# Patient Record
Sex: FEMALE | Race: WHITE | NOT HISPANIC OR LATINO | Employment: OTHER | URBAN - METROPOLITAN AREA
[De-identification: names, ages, dates, MRNs, and addresses within clinical notes are randomized per-mention and may not be internally consistent; named-entity substitution may affect disease eponyms.]

---

## 2024-01-29 DIAGNOSIS — Z12.11 COLON CANCER SCREENING: ICD-10-CM

## 2024-01-29 RX ORDER — POLYETHYLENE GLYCOL 3350, SODIUM SULFATE ANHYDROUS, SODIUM BICARBONATE, SODIUM CHLORIDE, POTASSIUM CHLORIDE 236; 22.74; 6.74; 5.86; 2.97 G/4L; G/4L; G/4L; G/4L; G/4L
4000 POWDER, FOR SOLUTION ORAL ONCE
Qty: 4000 ML | Refills: 0 | Status: SHIPPED | OUTPATIENT
Start: 2024-01-29 | End: 2024-01-29

## 2024-03-06 ENCOUNTER — APPOINTMENT (OUTPATIENT)
Dept: GASTROENTEROLOGY | Facility: EXTERNAL LOCATION | Age: 64
End: 2024-03-06
Payer: COMMERCIAL

## 2024-04-15 ENCOUNTER — OFFICE VISIT (OUTPATIENT)
Dept: GASTROENTEROLOGY | Facility: EXTERNAL LOCATION | Age: 64
End: 2024-04-15
Payer: COMMERCIAL

## 2024-04-15 DIAGNOSIS — D12.4 BENIGN NEOPLASM OF DESCENDING COLON: ICD-10-CM

## 2024-04-15 DIAGNOSIS — Z12.11 SCREEN FOR COLON CANCER: Primary | ICD-10-CM

## 2024-04-15 DIAGNOSIS — Z86.010 PERSONAL HISTORY OF COLONIC POLYPS: ICD-10-CM

## 2024-04-15 PROCEDURE — 88305 TISSUE EXAM BY PATHOLOGIST: CPT

## 2024-04-15 PROCEDURE — 88305 TISSUE EXAM BY PATHOLOGIST: CPT | Performed by: PATHOLOGY

## 2024-04-15 PROCEDURE — 45381 COLONOSCOPY SUBMUCOUS NJX: CPT | Performed by: INTERNAL MEDICINE

## 2024-04-15 PROCEDURE — 45385 COLONOSCOPY W/LESION REMOVAL: CPT | Performed by: INTERNAL MEDICINE

## 2024-04-15 NOTE — PROGRESS NOTES
Patient had surveillance colonoscopy and 3 polyps were removed.  One was larger in the splenic flexure and proximal descending colon the area that was estimated.  This was removed completely with snare and cautery.  2 clips were placed and spot was placed.  I recommend a repeat exam in 3 years if no evidence of dysplasia or malignancy.

## 2024-04-16 ENCOUNTER — LAB REQUISITION (OUTPATIENT)
Dept: LAB | Facility: HOSPITAL | Age: 64
End: 2024-04-16
Payer: COMMERCIAL

## 2024-04-20 LAB
LABORATORY COMMENT REPORT: NORMAL
PATH REPORT.FINAL DX SPEC: NORMAL
PATH REPORT.GROSS SPEC: NORMAL
PATH REPORT.RELEVANT HX SPEC: NORMAL
PATH REPORT.TOTAL CANCER: NORMAL

## 2024-08-22 ENCOUNTER — APPOINTMENT (OUTPATIENT)
Dept: PRIMARY CARE | Facility: CLINIC | Age: 64
End: 2024-08-22
Payer: COMMERCIAL

## 2024-08-22 VITALS
HEART RATE: 78 BPM | BODY MASS INDEX: 26.5 KG/M2 | SYSTOLIC BLOOD PRESSURE: 122 MMHG | OXYGEN SATURATION: 98 % | WEIGHT: 135 LBS | DIASTOLIC BLOOD PRESSURE: 76 MMHG | TEMPERATURE: 97.4 F | HEIGHT: 60 IN

## 2024-08-22 DIAGNOSIS — M25.561 RIGHT KNEE PAIN, UNSPECIFIED CHRONICITY: Primary | ICD-10-CM

## 2024-08-22 PROCEDURE — 99212 OFFICE O/P EST SF 10 MIN: CPT | Performed by: INTERNAL MEDICINE

## 2024-08-22 PROCEDURE — 3008F BODY MASS INDEX DOCD: CPT | Performed by: INTERNAL MEDICINE

## 2024-08-22 PROCEDURE — 1036F TOBACCO NON-USER: CPT | Performed by: INTERNAL MEDICINE

## 2024-08-22 ASSESSMENT — ENCOUNTER SYMPTOMS
NAUSEA: 0
APPETITE CHANGE: 0
DIZZINESS: 0
FLANK PAIN: 0
COUGH: 0
STRIDOR: 0
HEADACHES: 0
DIARRHEA: 0
WHEEZING: 0
NUMBNESS: 0
CONFUSION: 0
HALLUCINATIONS: 0
ABDOMINAL PAIN: 0
FEVER: 0
NECK PAIN: 0
VOMITING: 0
EYE PAIN: 0
PALPITATIONS: 0
JOINT SWELLING: 0
CONSTIPATION: 0
WEAKNESS: 0
NERVOUS/ANXIOUS: 0
SINUS PAIN: 0
COLOR CHANGE: 0
DYSURIA: 0
ACTIVITY CHANGE: 0
SORE THROAT: 0
SHORTNESS OF BREATH: 0
TREMORS: 0
WOUND: 0
MYALGIAS: 0
UNEXPECTED WEIGHT CHANGE: 0
ADENOPATHY: 0
SLEEP DISTURBANCE: 0
SEIZURES: 0
BACK PAIN: 0
HEMATURIA: 0
BLOOD IN STOOL: 0
FATIGUE: 0
ARTHRALGIAS: 1

## 2024-08-22 NOTE — PROGRESS NOTES
Subjective   Patient ID: Sheila Quan is a 63 y.o. female who presents for Knee Pain (Right knee pain since June, back of knee).    HPI   Patient has non traumatic right knee pain from June. She is able to walk but pain is not getting better and sometime she has to take OTC analgesics.  Patient want to see an orthopedics for evaluation.     Review of Systems   Constitutional:  Negative for activity change, appetite change, fatigue, fever and unexpected weight change.   HENT:  Negative for dental problem, ear discharge, hearing loss, nosebleeds, postnasal drip, sinus pain and sore throat.    Eyes:  Negative for pain.   Respiratory:  Negative for cough, shortness of breath, wheezing and stridor.    Cardiovascular:  Negative for chest pain, palpitations and leg swelling.   Gastrointestinal:  Negative for abdominal pain, blood in stool, constipation, diarrhea, nausea and vomiting.   Genitourinary:  Negative for decreased urine volume, dyspareunia, dysuria, flank pain, hematuria and urgency.   Musculoskeletal:  Positive for arthralgias. Negative for back pain, gait problem, joint swelling, myalgias and neck pain.   Skin:  Negative for color change, rash and wound.   Allergic/Immunologic: Negative for environmental allergies and food allergies.   Neurological:  Negative for dizziness, tremors, seizures, syncope, weakness, numbness and headaches.   Hematological:  Negative for adenopathy.   Psychiatric/Behavioral:  Negative for behavioral problems, confusion, hallucinations, self-injury and sleep disturbance. The patient is not nervous/anxious.      Objective   /76   Pulse 78   Temp 36.3 °C (97.4 °F)   Ht 1.524 m (5')   Wt 61.2 kg (135 lb)   SpO2 98%   BMI 26.37 kg/m²     Physical Exam  Vitals and nursing note reviewed.   Constitutional:       General: She is not in acute distress.     Appearance: Normal appearance. She is not ill-appearing or toxic-appearing.   HENT:      Nose: Nose normal.   Eyes:       Conjunctiva/sclera: Conjunctivae normal.   Pulmonary:      Effort: Pulmonary effort is normal.   Musculoskeletal:         General: No deformity or signs of injury.      Cervical back: Normal range of motion.      Right lower leg: No edema.      Left lower leg: No edema.   Neurological:      General: No focal deficit present.      Mental Status: She is alert and oriented to person, place, and time.   Psychiatric:         Mood and Affect: Mood normal.         Behavior: Behavior normal.       Assessment/Plan   Problem List Items Addressed This Visit    None  Visit Diagnoses       Right knee pain, unspecified chronicity    -  Primary    Relevant Orders    Referral to Orthopaedic Surgery

## 2024-08-27 ENCOUNTER — HOSPITAL ENCOUNTER (OUTPATIENT)
Dept: RADIOLOGY | Facility: HOSPITAL | Age: 64
Discharge: HOME | End: 2024-08-27
Payer: COMMERCIAL

## 2024-08-27 ENCOUNTER — TELEPHONE (OUTPATIENT)
Dept: ORTHOPEDIC SURGERY | Facility: CLINIC | Age: 64
End: 2024-08-27

## 2024-08-27 ENCOUNTER — APPOINTMENT (OUTPATIENT)
Dept: ORTHOPEDIC SURGERY | Facility: CLINIC | Age: 64
End: 2024-08-27
Payer: COMMERCIAL

## 2024-08-27 DIAGNOSIS — M25.561 RIGHT KNEE PAIN, UNSPECIFIED CHRONICITY: Primary | ICD-10-CM

## 2024-08-27 DIAGNOSIS — M25.561 RIGHT KNEE PAIN, UNSPECIFIED CHRONICITY: ICD-10-CM

## 2024-08-27 PROCEDURE — 73564 X-RAY EXAM KNEE 4 OR MORE: CPT | Mod: RT

## 2024-08-27 PROCEDURE — 99204 OFFICE O/P NEW MOD 45 MIN: CPT

## 2024-08-27 PROCEDURE — 1036F TOBACCO NON-USER: CPT

## 2024-08-27 ASSESSMENT — PAIN SCALES - GENERAL: PAINLEVEL_OUTOF10: 1

## 2024-08-27 ASSESSMENT — PAIN - FUNCTIONAL ASSESSMENT: PAIN_FUNCTIONAL_ASSESSMENT: 0-10

## 2024-08-27 NOTE — PROGRESS NOTES
ISELA Quan is a 63 y.o. female  in office today for   Chief Complaint   Patient presents with    Right Knee - Pain     Pt states the knee has been bothering her since June-She does not recall any injuries-she does take a lot of walks. She walks a dog that does pull a lot    .  she states pain is posterior knee, worse with first standing when it feels stiff.  She has tried ice/heat, antiinflammatories occasionally.      Medication  No current outpatient medications on file prior to visit.     No current facility-administered medications on file prior to visit.       Physical Exam  Constitutional: well developed, well nourished female in no acute distress  Psychiatric: normal mood, appropriate affect  Eyes: sclera anicteric  HENT: normocephalic/atraumatic  CV: regular rate and rhythm   Respiratory: non labored breathing  Integumentary: no rash  Neurological: moves all extremities    Right Knee Exam     Tenderness   Right knee tenderness location: posterior knee.    Range of Motion   The patient has normal right knee ROM.    Tests   Lolis:  Medial - positive Lateral - negative  Varus: negative Valgus: negative  Drawer:  Anterior - negative    Posterior - negative  Patellar apprehension: negative    Other   Erythema: absent  Scars: absent  Sensation: normal  Swelling: mild              Imaging/Lab:  X-rays were taken today which were reviewed by myself and read by myself and show mild degenerative changes with osteophyte formation patellofemoral.  No acute fracture or dislocation      Assessment  Assessment: right knee pain    Plan  Plan:  History, physical exam, and imaging were reviewed with patient. Discussed physical exam can not exclude meniscus injury.  Discussed starting with conservative options for knee pain including RICE, antiinflammatories, injections, and PT.  States she had a reaction to oral steroids in the past, so not wanting a steroid injection.  PT orders given.  Will continue with RICE  and NSAIDs as needed.  Follow Up: Patient to follow up as needed if pain persists or gets worse.      All questions were answered for the patient prior to end of exam and patient addressed their understanding.    Sary Moreno PA-C  08/27/24

## 2024-09-19 NOTE — PROGRESS NOTES
Physical Therapy    Physical Therapy Evaluation     Patient Name: Sheila Quan  MRN: [id number]  Today's Date: 9/20/2024    Time Entry:   Time Calculation  Start Time: 1025  Stop Time: 1100  Time Calculation (min): 35 min  PT Evaluation Time Entry  PT Evaluation (Low) Time Entry: 35      Assessment:  PT Assessment  PT Assessment Results: Decreased strength, Pain  Rehab Prognosis: Good  Evaluation/Treatment Tolerance: Patient tolerated treatment well (Patient noted no increased pain at end of session and left therapy in no distress.)   The patient is a 62 y/o female being seen in outpatient therapy to address reports of posterior and anterior right knee pain. The patient demonstrates the following impairments: decreased RLE strength per MMT, effusion of right knee, decreased LE muscular flexibility, and patient reports of pain in R knee on both anterior and posterior aspect. The above listed impairments lead to the following functional limitations: decreased sleep quality due to pain, difficulty completing heavier activities, difficulty walking dog on leash, and difficulty completing some ADLs/IADLs. The patient would benefit from skilled physical therapy services to address the above listed impairments and functional deficits in order to return the patient to their PLOF and improve QoL.      Plan:  OP PT Plan  Treatment/Interventions: Cryotherapy, Education/ Instruction, Electrical stimulation, Gait training, Manual therapy, Neuromuscular re-education, Therapeutic activities, Therapeutic exercises, Taping techniques, Ultrasound  PT Plan: Skilled PT  PT Frequency: 2 times per week  Duration: 4-5 weeks  Onset Date: 08/27/24  Number of Treatments Authorized: 20V Hard max  Rehab Potential: Good  Plan of Care Agreement: Patient    Current Problem:   1. Weakness of right lower extremity        2. Right knee pain, unspecified chronicity  Referral to Physical Therapy    Follow Up In Physical Therapy      3. Effusion,  "right knee            Subjective    The patient reports her right knee started to bother her in June 2024. Notes no MERLIN, just got up one morning and took a couple steps and it was painful. Notes she uses ice and heat as needed. Avoids taking medication if she can. Notes she had x-ray that showed arthritis. Notes her knee will swell and mostly bothers her behind her knee. Will get slight pain in front of right knee. Notes her left knee will bother her intermittently, but not as much as right knee.  No history of knee injuries or surgeries in the past other than bruising.   Notes she does experience popping/cracking, does not notice knee locking/getting stuck.    Notes she has not had any injections, has had bad reaction to steroid in the past.  Notes pain does not wake her up at night, but will keep her from falling asleep.     Goal for therapy: \"Hoping my knee will feel better and learn some things that will help.\"     General:  General  Reason for Referral: Diagnosis  M25.561 (ICD-10-CM) - Right knee pain, unspecified chronicity  Referred By: Sary Moreno PA-C  Past Medical History Relevant to Rehab: hx of heart surgery in 1963 (pt notes she had recent check up and it was clear- no restrictions/precautions), hx of several breast surgeries, OA in knees  Family/Caregiver Present: No  General Comment: Visit 1, no auth/20V hard max, onset 8/27/24  Precautions:  Precautions  STEADI Fall Risk Score (The score of 4 or more indicates an increased risk of falling): 4  Precautions Comment: hx of abnormal breast cells/no formal cancer dx, no pacemaker or implanted device    Pain:   Location: posterior and anterior R knee when she does have pain   Description: ache   Current: 0/10   Best: 0/10; heat, ice   Worst: 7-8/10; some difficulty sleeping, overdoing activities, dog pulling on leash if she is walking her, pivoting/turning     Home Living:   Multi story home. No trouble with stairs or getting around her home.     Prior " "Level of Function:   No knee pain prior to June 2024.   CLOF: able to complete all ADLs/IADLs IND. Is more cautious with knee since pain started.   Hobbies: walking dog, volunteers a couple times a month, sewing, scrap booking   Currently retired.       Objective   Knee Joint Line Girth:   Right: 34 cm   Left: 33 cm     HIP  Functional Rating Scale  LEFS   /80: 69 points    Specific Lower Extremity MMT  R Iliopsoas: (5/5): 4+  L Iliopsoas: (5/5): 4+  R Gluteals (prone): (5/5): 3 (bridge)  L Gluteals (prone): (5/5): 3 (bridge)  R Gluteals (sidelying): (5/5): 4  L Gluteals (sidelying): (5/5): 4  R knee flexion: (5/5): 4 P! post knee  L knee flexion: (5/5): 4  R knee extension: (5/5): 4 P! ant knee  L knee extension: (5/5): 4 slight P!    Gait  Gait Comment: The patient presents to therapy ambulating without AD. No obvious gait deficits. Patient completed (4) 6\" steps with reciprocal pattern to ascend and descend; no UE support. Pt noted pain in R knee when pivoting on RLE.  Knee Palpation/Joint Mobility  Palpation/Joint Mobility Comment: TTP noted along R LCL/posterior/lateral right knee (Effusion noted behind B knees in popliteal fossa; both appear to be about the same). No TTP noted in popliteal fossa B  Knee AROM  R knee flexion: (140°): 131  L knee flexion: (140°): 134  R knee extension: (0°): +2  L knee extension: (0°): -1    Special Tests  Varus at 0°: (Negative): negative  Varus at 30°: (Negative): negative  Valgus at 0°: (Negative): negative  Valgus at 30°: (Negative): negative  Lolis’s: (Negative): positive medial- pain in medial aspect of knee     Flexibility  R hamstrings: fair+  L hamstrings: fair+  Flexibility Comment: Fair+ B gastrocnemius  Ankle MMT  R ankle dorsiflexion: (5/5): 5  L ankle dorsiflexion: (5/5): 5  R ankle plantarflexion: (5/5): 4+  L ankle plantarflexion: (5/5): 4+    Treatments:  No treatments completed this date.     EDUCATION:  Patient educated on PT purpose, POC, d/c planning, and " "importance of HEP compliance.   Outpatient Education  Individual(s) Educated: Patient  Education Provided: Anatomy, POC  Patient/Caregiver Demonstrated Understanding: yes  Plan of Care Discussed and Agreed Upon: yes  Patient Response to Education: Patient/Caregiver Verbalized Understanding of Information    Goals:  Active       PT Problem       PT Goal 1       Start:  09/20/24    Expected End:  10/25/24       The patient will score at least 79 points on LEFS in order to demonstrate improved sx level and improved ability to complete functional mobility tasks and ADLs/IADLs.           PT Goal 2       Start:  09/20/24    Expected End:  10/25/24       The patient will report 0/10 pain in right knee during functional activities in order to demonstrate improved pain levels.           PT Goal 3       Start:  09/20/24    Expected End:  10/25/24       The patient will demonstrate 5/5 strength via MMT of RLE in order to improve ability to complete functional activities and mobility for ADLs/IADLs.           PT Goal 4       Start:  09/20/24    Expected End:  10/25/24       The patient will report the ability to pivot on RLE when completing ambulation and ADLs/IADLs without reports of increase in pain.         PT Goal 5       Start:  09/20/24    Expected End:  10/25/24       The patient will subjectively report compliance with recommended HEP in order to maximize functional gains during physical therapy plan of care.           Patient Stated Goal 1       Start:  09/20/24    Expected End:  10/25/24       \"Hoping my knee will feel better and learn some things that will help.\"                      "

## 2024-09-20 ENCOUNTER — EVALUATION (OUTPATIENT)
Dept: PHYSICAL THERAPY | Facility: CLINIC | Age: 64
End: 2024-09-20
Payer: COMMERCIAL

## 2024-09-20 DIAGNOSIS — R29.898 WEAKNESS OF RIGHT LOWER EXTREMITY: Primary | ICD-10-CM

## 2024-09-20 DIAGNOSIS — M25.461 EFFUSION, RIGHT KNEE: ICD-10-CM

## 2024-09-20 DIAGNOSIS — M25.561 RIGHT KNEE PAIN, UNSPECIFIED CHRONICITY: ICD-10-CM

## 2024-09-20 PROCEDURE — 97161 PT EVAL LOW COMPLEX 20 MIN: CPT | Mod: GP

## 2024-09-20 ASSESSMENT — ENCOUNTER SYMPTOMS
LOSS OF SENSATION IN FEET: 0
OCCASIONAL FEELINGS OF UNSTEADINESS: 0
DEPRESSION: 0

## 2024-09-24 ENCOUNTER — TREATMENT (OUTPATIENT)
Dept: PHYSICAL THERAPY | Facility: CLINIC | Age: 64
End: 2024-09-24
Payer: COMMERCIAL

## 2024-09-24 DIAGNOSIS — R29.898 WEAKNESS OF RIGHT LOWER EXTREMITY: Primary | ICD-10-CM

## 2024-09-24 DIAGNOSIS — M25.461 EFFUSION, RIGHT KNEE: ICD-10-CM

## 2024-09-24 DIAGNOSIS — M25.561 RIGHT KNEE PAIN, UNSPECIFIED CHRONICITY: ICD-10-CM

## 2024-09-24 PROCEDURE — 97110 THERAPEUTIC EXERCISES: CPT | Mod: GP,CQ

## 2024-09-24 NOTE — PROGRESS NOTES
"Physical Therapy                                                                                  Physical Therapy Treatment       Patient name: Sheila Quan  MRN:   [id number]  Today's Date: 9/24/24     Time Calculation  Start Time: 1030  Stop Time: 1120  Time Calculation (min): 50 min  1. Weakness of right lower extremity        2. Right knee pain, unspecified chronicity  Follow Up In Physical Therapy      3. Effusion, right knee               General  Reason for Referral: Diagnosis  M25.561 (ICD-10-CM) - Right knee pain, unspecified chronicity  Referred By: Sary Moreno PA-C  Past Medical History Relevant to Rehab: hx of heart surgery in 1963 (pt notes she had recent check up and it was clear- no restrictions/precautions), hx of several breast surgeries, OA in knees  Visit 2, no auth/20V hard max, onset 8/27/24    Precautions  hx of abnormal breast cells/no formal cancer dx, no pacemaker or implanted device     Subjective: Pt states she is able to walk half hour periods of time ~3 x week (with dog)  Response to last session: good    Performing HEP: yes    Pain   6-7/10 R knee  4/10 end of session  Treatment:  Therapeutic exercise x 40 minutes   Sci fit level 1 x 8 minutes seat 9   -Supine Quad Set  10 reps - 5\" hold  - Active Straight Leg Raise with Quad Set   10 reps  - Supine Straight Leg Hip Adduction and Quad Set with Ball  10 reps - 5\" hold  - Short Arc Quad with Ball Squeeze   10 reps - 5\" hold  - Quad Setting and Stretching 10-20\" x 5-6 reps    Use of modality to reduce pain  and /or swelling of area treated  CP to R knee x 10 minutes after exercise  Assessment:  Receptive to instruction. Tolerated session well without increase in pain or symptoms  Plan:    OP PT Plan  Treatment/Interventions: Cryotherapy, Education/ Instruction, Electrical stimulation, Gait training, Manual therapy, Neuromuscular re-education, Therapeutic activities, Therapeutic exercises, Taping techniques, Ultrasound  PT Plan: Skilled " "PT  PT Frequency: 2 times per week  Duration: 4-5 weeks  Onset Date: 08/27/24  Number of Treatments Authorized: 20V Hard max   Education:   Access Code: [email or web address]  URL: [email or web address]  Date: 09/24/2024  Prepared by: Marilin Bender  Work below pain threshold . Stop exercise if irritates symptom while performing  Exercises  - Supine Quad Set  - 1-3 x daily - 7 x weekly - 1-3 sets - 10 reps - 5\" hold  - Active Straight Leg Raise with Quad Set  - 1-3 x daily - 7 x weekly - 1-3 sets - 10 reps  - Supine Straight Leg Hip Adduction and Quad Set with Ball  - 1-3 x daily - 7 x weekly - 1-3 sets - 10 reps - 5\" hold  - Short Arc Quad with Ball Squeeze  - 1-3 x daily - 7 x weekly - 1-3 sets - 10 reps - 5\" hold  - Quad Setting and Stretching  - 1-3 x daily - 7 x weekly - 1-3 sets - 10 reps - 5\" hold    Marilin Bender, PTA    Active       PT Problem       PT Goal 1       Start:  09/20/24    Expected End:  10/25/24       The patient will score at least 79 points on LEFS in order to demonstrate improved sx level and improved ability to complete functional mobility tasks and ADLs/IADLs.           PT Goal 2       Start:  09/20/24    Expected End:  10/25/24       The patient will report 0/10 pain in right knee during functional activities in order to demonstrate improved pain levels.           PT Goal 3       Start:  09/20/24    Expected End:  10/25/24       The patient will demonstrate 5/5 strength via MMT of RLE in order to improve ability to complete functional activities and mobility for ADLs/IADLs.           PT Goal 4       Start:  09/20/24    Expected End:  10/25/24       The patient will report the ability to pivot on RLE when completing ambulation and ADLs/IADLs without reports of increase in pain.         PT Goal 5       Start:  09/20/24    Expected End:  10/25/24       The patient will subjectively report compliance with recommended HEP in order to maximize functional gains during physical " "therapy plan of care.           Patient Stated Goal 1       Start:  09/20/24    Expected End:  10/25/24       \"Hoping my knee will feel better and learn some things that will help.\"              "

## 2024-09-30 ENCOUNTER — TREATMENT (OUTPATIENT)
Dept: PHYSICAL THERAPY | Facility: CLINIC | Age: 64
End: 2024-09-30
Payer: COMMERCIAL

## 2024-09-30 DIAGNOSIS — M25.461 EFFUSION, RIGHT KNEE: ICD-10-CM

## 2024-09-30 DIAGNOSIS — R29.898 WEAKNESS OF RIGHT LOWER EXTREMITY: Primary | ICD-10-CM

## 2024-09-30 DIAGNOSIS — M25.561 RIGHT KNEE PAIN, UNSPECIFIED CHRONICITY: ICD-10-CM

## 2024-09-30 PROCEDURE — 97110 THERAPEUTIC EXERCISES: CPT | Mod: GP

## 2024-09-30 NOTE — PROGRESS NOTES
Physical Therapy    Physical Therapy Treatment    Patient Name: Sheila Quan  MRN: [id number]  Today's Date: 9/30/2024    Time Entry:   Time Calculation  Start Time: 1133  Stop Time: 1223  Time Calculation (min): 50 min     PT Therapeutic Procedures Time Entry  Therapeutic Exercise Time Entry: 40                   Assessment:   Patient appeared to tolerate session well, noting no increase in pain in right knee at end of session. Added in further proximal hip strengthening activities with patient requiring intermittent verbal cueing for correct exercise technique. Patient educated to continue with HEP given at previous session in pain free ROM only; no new HEP given this date. Patient would likely continue to benefit from skilled physical therapy services.     Plan:   Progress POC as able and tolerated by patient. Adjust plan as needed.      Current Problem  1. Weakness of right lower extremity        2. Right knee pain, unspecified chronicity  Follow Up In Physical Therapy      3. Effusion, right knee            General     General  Reason for Referral: Diagnosis  M25.561 (ICD-10-CM) - Right knee pain, unspecified chronicity  Referred By: Sary Moreno PA-C  Past Medical History Relevant to Rehab: hx of heart surgery in 1963 (pt notes she had recent check up and it was clear- no restrictions/precautions), hx of several breast surgeries, OA in knees  General Comment: Visit 3, no auth/20V hard max, onset 8/27/24    Subjective    The patient notes her right knee is achy today. Notes she had muscle soreness after last session. Notes home exercises went okay, no increase in pain with exercises. Has normal level of swelling today. Did a lot of cleaning yesterday involving cleaning on hands and knees. Had difficulty getting up/down off of floor. Feels like she doesn't have the strength she needs to push herself up, had a hard time with it.     Precautions  Precautions  STEADI Fall Risk Score (The score of 4 or more  indicates an increased risk of falling): 4  Precautions Comment: hx of abnormal breast cells/no formal cancer dx, no pacemaker or implanted device    Pain   6/10 pain in right knee, mostly in back of knee. Patient noted no increased pain at end of session and left therapy in no distress.     Objective   Treatment:    Therapeutic Exercise (36171)- 40 min   Supine/Mat:   -R/L quad set with small bolster x 10 reps, then x 5 reps with small bolster; pt trialed without bolster as well and noted she preferred used of bolster   -Sci fit level 1 x 10 minutes seat 9 BUEs and BLEs for active warm up   Standing:   -R/L hamstring stretch at stairs 20 sec hold x 3 reps, BUE support; VC for technique   Supine/Mat:   -R/L SLR x 10 reps each; VC for technique   -Small ROM bridge x 10 reps, 5 sec hold   -Hooklying hip adduction ball squeeze 2 x 10 reps, 5 sec hold   -Hooklying B hip abduction against orange tband 2 x 10 reps, 5 sec hold     Use of modality to reduce pain/post exercise soreness:   CP to R knee x 10 minutes after exercise in supine     OP EDUCATION:   Correct exercise technique in pain free range of motion. Holding any exercise that causes pain.     Goals:  Active       PT Problem       PT Goal 1       Start:  09/20/24    Expected End:  10/25/24       The patient will score at least 79 points on LEFS in order to demonstrate improved sx level and improved ability to complete functional mobility tasks and ADLs/IADLs.           PT Goal 2       Start:  09/20/24    Expected End:  10/25/24       The patient will report 0/10 pain in right knee during functional activities in order to demonstrate improved pain levels.           PT Goal 3       Start:  09/20/24    Expected End:  10/25/24       The patient will demonstrate 5/5 strength via MMT of RLE in order to improve ability to complete functional activities and mobility for ADLs/IADLs.           PT Goal 4       Start:  09/20/24    Expected End:  10/25/24       The patient  "will report the ability to pivot on RLE when completing ambulation and ADLs/IADLs without reports of increase in pain.         PT Goal 5       Start:  09/20/24    Expected End:  10/25/24       The patient will subjectively report compliance with recommended HEP in order to maximize functional gains during physical therapy plan of care.           Patient Stated Goal 1       Start:  09/20/24    Expected End:  10/25/24       \"Hoping my knee will feel better and learn some things that will help.\"              "

## 2024-10-02 ENCOUNTER — DOCUMENTATION (OUTPATIENT)
Dept: PHYSICAL THERAPY | Facility: CLINIC | Age: 64
End: 2024-10-02
Payer: COMMERCIAL

## 2024-10-09 ENCOUNTER — TREATMENT (OUTPATIENT)
Dept: PHYSICAL THERAPY | Facility: CLINIC | Age: 64
End: 2024-10-09
Payer: COMMERCIAL

## 2024-10-09 DIAGNOSIS — M25.561 RIGHT KNEE PAIN, UNSPECIFIED CHRONICITY: ICD-10-CM

## 2024-10-09 DIAGNOSIS — M25.461 EFFUSION, RIGHT KNEE: ICD-10-CM

## 2024-10-09 DIAGNOSIS — R29.898 WEAKNESS OF RIGHT LOWER EXTREMITY: Primary | ICD-10-CM

## 2024-10-09 PROCEDURE — 97110 THERAPEUTIC EXERCISES: CPT | Mod: GP,CQ

## 2024-10-09 NOTE — PROGRESS NOTES
Physical Therapy                                                                                  Physical Therapy Treatment       Patient name: Sheila Quan  MRN:   [id number]  Today's Date: 10/9/24     Time Calculation  Start Time: 0732  Stop Time: 0815  Time Calculation (min): 43 min  1. Weakness of right lower extremity        2. Right knee pain, unspecified chronicity  Follow Up In Physical Therapy      3. Effusion, right knee               General  Reason for Referral: Diagnosis  M25.561 (ICD-10-CM) - Right knee pain, unspecified chronicity  Referred By: Sary Moreno PA-C  Past Medical History Relevant to Rehab: hx of heart surgery in 1963 (pt notes she had recent check up and it was clear- no restrictions/precautions), hx of several breast surgeries, OA in knees   Visit 4, no auth/20V hard max, onset 8/27/24    Precautions; hx of abnormal breast cells/no formal cancer dx, no pacemaker or implanted device     Subjective:Spaces activity to avoid irritating knee such as kneeling with yard work , uses knee pad. Pt notes irritation across front of knee indicating patellar tendon. Pt does not feel any specific improvement since starting PT .  Response to last session: ok    Performing HEP: yes    Pain   5/10 'ache'      Treatment:  Therapeutic Exercise 43 minutes Program designed to eliminate pain increase flexibility ,increase strength for full functional pain free mobility  -Sci fit level 1 x 10 minutes seat 9 BUEs and BLEs for active warm up   Supine/Mat:   -R/L quad set with small bolster x 10 reps  -R/L SLR x 10 reps each  -Small ROM bridge x 10 reps, 3 sec hold   -Hooklying hip adduction ball squeeze 2 x 10 reps, 5 sec hold   VMO SLR 10 reps bilaterally   -Hooklying B hip abduction against orange tband 2 x 10 reps, 5 sec hold  Long sitting  -R/L hamstring stretch from edge of mat table ; VC for technique     Assessment:  Pt completed exercise without difficulty. Tolerated session well without increase in pain  "or symptoms  Plan:    OP PT Plan  Treatment/Interventions: Cryotherapy, Education/ Instruction, Electrical stimulation, Gait training, Manual therapy, Neuromuscular re-education, Therapeutic activities, Therapeutic exercises, Taping techniques, Ultrasound  PT Plan: Skilled PT  PT Frequency: 2 times per week  Duration: 4-5 weeks  Onset Date: 08/27/24  Number of Treatments Authorized: 20V Hard max   Education:       Marilin Bender, PTA    Active       PT Problem       PT Goal 1       Start:  09/20/24    Expected End:  10/25/24       The patient will score at least 79 points on LEFS in order to demonstrate improved sx level and improved ability to complete functional mobility tasks and ADLs/IADLs.           PT Goal 2       Start:  09/20/24    Expected End:  10/25/24       The patient will report 0/10 pain in right knee during functional activities in order to demonstrate improved pain levels.           PT Goal 3       Start:  09/20/24    Expected End:  10/25/24       The patient will demonstrate 5/5 strength via MMT of RLE in order to improve ability to complete functional activities and mobility for ADLs/IADLs.           PT Goal 4       Start:  09/20/24    Expected End:  10/25/24       The patient will report the ability to pivot on RLE when completing ambulation and ADLs/IADLs without reports of increase in pain.         PT Goal 5       Start:  09/20/24    Expected End:  10/25/24       The patient will subjectively report compliance with recommended HEP in order to maximize functional gains during physical therapy plan of care.           Patient Stated Goal 1       Start:  09/20/24    Expected End:  10/25/24       \"Hoping my knee will feel better and learn some things that will help.\"              "

## 2024-10-14 ENCOUNTER — TREATMENT (OUTPATIENT)
Dept: PHYSICAL THERAPY | Facility: CLINIC | Age: 64
End: 2024-10-14
Payer: COMMERCIAL

## 2024-10-14 DIAGNOSIS — M25.461 EFFUSION, RIGHT KNEE: ICD-10-CM

## 2024-10-14 DIAGNOSIS — R29.898 WEAKNESS OF RIGHT LOWER EXTREMITY: Primary | ICD-10-CM

## 2024-10-14 DIAGNOSIS — M25.561 RIGHT KNEE PAIN, UNSPECIFIED CHRONICITY: ICD-10-CM

## 2024-10-14 PROCEDURE — 97110 THERAPEUTIC EXERCISES: CPT | Mod: GP,CQ

## 2024-10-14 NOTE — PROGRESS NOTES
"Physical Therapy                                                                                  Physical Therapy Treatment       Patient name: Sheila Quan  MRN:   [id number]  Today's Date: 10/14/24     Time Calculation  Start Time: 1053  Stop Time: 1151  Time Calculation (min): 58 min  1. Weakness of right lower extremity        2. Right knee pain, unspecified chronicity  Follow Up In Physical Therapy      3. Effusion, right knee               General  Reason for Referral: Diagnosis  M25.561 (ICD-10-CM) - Right knee pain, unspecified chronicity  Referred By: Sary Moreno PA-C  Past Medical History Relevant to Rehab: hx of heart surgery in 1963 (pt notes she had recent check up and it was clear- no restrictions/precautions), hx of several breast surgeries, OA in knees  Visit 5, no auth/20V hard max, onset 8/27/24  Precautions : hx of abnormal breast cells/no formal cancer dx, no pacemaker or implanted device     Subjective: More sore Saturday. Yesterday better did not feel as swollen . Same today . Typically feel increase tightness /soreness back of knee.  Pt feels therapy is he;ping to improve so to meet goals   Response to last session:  good   Performing HEP: yes    Pain   5/10 upon arrival  3-4/10 end of session  Treatment:      Therapeutic Exercise 58 minutes Program designed to eliminate pain increase flexibility ,increase strength for full functional pain free mobility  -Sci fit level 1 x 10 minutes seat 10 BUEs and BLEs   Supine/Mat:   -R/L quad set with small bolster x 15 reps  -R/L SLR x 15 reps each  -Small ROM bridge x 15 reps, 3 sec hold   -Hooklying hip adduction ball squeeze  x 15 reps, 5 sec hold   VMO SLR 15 reps bilaterally   -Hooklying B hip abduction against orange tband 2 x 15 reps, sec hold  Long sitting  -R/L hamstring stretch from edge of mat table 10 reps 10\" holds  Assessment:Pt has had improvement in symptoms and demonstrates good form with exercises performed. Paces self, requires " "almost an hour to complete     Plan  OP PT Plan Treatment/Interventions: Cryotherapy, Education/ Instruction, Electrical stimulation, Gait training, Manual therapy, Neuromuscular re-education, Therapeutic activities, Therapeutic exercises, Taping techniques, Ultrasound  PT Plan: Skilled PT  PT Frequency: 2 times per week  Duration: 4-5 weeks  Onset Date: 08/27/24  Number of Treatments Authorized: 20V Hard max       Active       PT Problem       PT Goal 1       Start:  09/20/24    Expected End:  10/25/24       The patient will score at least 79 points on LEFS in order to demonstrate improved sx level and improved ability to complete functional mobility tasks and ADLs/IADLs.           PT Goal 2       Start:  09/20/24    Expected End:  10/25/24       The patient will report 0/10 pain in right knee during functional activities in order to demonstrate improved pain levels.           PT Goal 3       Start:  09/20/24    Expected End:  10/25/24       The patient will demonstrate 5/5 strength via MMT of RLE in order to improve ability to complete functional activities and mobility for ADLs/IADLs.           PT Goal 4       Start:  09/20/24    Expected End:  10/25/24       The patient will report the ability to pivot on RLE when completing ambulation and ADLs/IADLs without reports of increase in pain.         PT Goal 5       Start:  09/20/24    Expected End:  10/25/24       The patient will subjectively report compliance with recommended HEP in order to maximize functional gains during physical therapy plan of care.           Patient Stated Goal 1       Start:  09/20/24    Expected End:  10/25/24       \"Hoping my knee will feel better and learn some things that will help.\"              "

## 2024-10-16 ENCOUNTER — TREATMENT (OUTPATIENT)
Dept: PHYSICAL THERAPY | Facility: CLINIC | Age: 64
End: 2024-10-16
Payer: COMMERCIAL

## 2024-10-16 DIAGNOSIS — R29.898 WEAKNESS OF RIGHT LOWER EXTREMITY: Primary | ICD-10-CM

## 2024-10-16 DIAGNOSIS — M25.561 RIGHT KNEE PAIN, UNSPECIFIED CHRONICITY: ICD-10-CM

## 2024-10-16 DIAGNOSIS — M25.461 EFFUSION, RIGHT KNEE: ICD-10-CM

## 2024-10-16 PROCEDURE — 97110 THERAPEUTIC EXERCISES: CPT | Mod: GP,CQ

## 2024-10-16 NOTE — PROGRESS NOTES
"Physical Therapy                                                                                  Physical Therapy Treatment       Patient name: Sheila Quan  MRN:   [id number]  Today's Date: 10/16/24     Time Calculation  Start Time: 0815  Stop Time: 0905  Time Calculation (min): 50 min  1. Weakness of right lower extremity        2. Right knee pain, unspecified chronicity  Follow Up In Physical Therapy      3. Effusion, right knee               General  Reason for Referral: Diagnosis  M25.561 (ICD-10-CM) - Right knee pain, unspecified chronicity  Referred By: Sary Moreno PA-C  Past Medical History Relevant to Rehab: hx of heart surgery in 1963 (pt notes she had recent check up and it was clear- no restrictions/precautions), hx of several breast surgeries, OA in knees  Visit 6, no auth/20V hard max, onset 8/27/24  Precautions  hx of abnormal breast cells/no formal cancer dx, no pacemaker or implanted device     Subjective: Not as stiff getting up this morning .  Feels steady improvement in symptoms . Notes some discomfort 'under knee cap ' following 2nd set of QS   Feels improvement   Response to last session: good    Performing HEP: yes    Pain   3/10 start of session  1/10 end of session  Treatment:     Therapeutic Exercise 50 minutes Program designed to eliminate pain increase flexibility ,increase strength for full functional pain free mobility  -Sci fit level 2 x 15 minutes seat 10 BUEs and BLEs   Supine/Mat:   -R/L quad set with small bolster  2 sets x 10 reps (2nd set end of session)  -R/L SLR  2 sets x 10 reps 2# ankle cuffs bilaterally  -Small ROM bridge  2 sets x 10 reps, 3 sec hold   -Hooklying hip adduction ball squeeze  2 sets  x 10 reps, 5 sec hold   VMO SLR 10 reps x 2 sets 2# ankle cuffs bilaterally   -Hooklying B hip abduction against orange tband 2 x 15 reps, sec hold  Long sitting  -R/L hamstring stretch from edge of mat table 3 reps 20\" holds 1 set before exer , 1 set after exer " "completed  Assessment:  Tolerated previous session well . Therapeutic exercises completed this date to improve strength  so to perform pain free  functional activities   Plan:    OP PT Plan  Treatment/Interventions: Cryotherapy, Education/ Instruction, Electrical stimulation, Gait training, Manual therapy, Neuromuscular re-education, Therapeutic activities, Therapeutic exercises, Taping techniques, Ultrasound  PT Plan: Skilled PT  PT Frequency: 2 times per week  Duration: 4-5 weeks  Onset Date: 08/27/24  Number of Treatments Authorized: 20V Hard max          Marilin Bender, PTA    Active       PT Problem       PT Goal 1       Start:  09/20/24    Expected End:  10/25/24       The patient will score at least 79 points on LEFS in order to demonstrate improved sx level and improved ability to complete functional mobility tasks and ADLs/IADLs.           PT Goal 2       Start:  09/20/24    Expected End:  10/25/24       The patient will report 0/10 pain in right knee during functional activities in order to demonstrate improved pain levels.           PT Goal 3       Start:  09/20/24    Expected End:  10/25/24       The patient will demonstrate 5/5 strength via MMT of RLE in order to improve ability to complete functional activities and mobility for ADLs/IADLs.           PT Goal 4       Start:  09/20/24    Expected End:  10/25/24       The patient will report the ability to pivot on RLE when completing ambulation and ADLs/IADLs without reports of increase in pain.         PT Goal 5       Start:  09/20/24    Expected End:  10/25/24       The patient will subjectively report compliance with recommended HEP in order to maximize functional gains during physical therapy plan of care.           Patient Stated Goal 1       Start:  09/20/24    Expected End:  10/25/24       \"Hoping my knee will feel better and learn some things that will help.\"              "

## 2024-10-22 ENCOUNTER — APPOINTMENT (OUTPATIENT)
Dept: PHYSICAL THERAPY | Facility: CLINIC | Age: 64
End: 2024-10-22
Payer: COMMERCIAL

## 2024-10-24 ENCOUNTER — APPOINTMENT (OUTPATIENT)
Dept: PHYSICAL THERAPY | Facility: CLINIC | Age: 64
End: 2024-10-24
Payer: COMMERCIAL

## 2024-11-01 ENCOUNTER — TREATMENT (OUTPATIENT)
Dept: PHYSICAL THERAPY | Facility: CLINIC | Age: 64
End: 2024-11-01
Payer: COMMERCIAL

## 2024-11-01 DIAGNOSIS — M25.461 EFFUSION, RIGHT KNEE: Primary | ICD-10-CM

## 2024-11-01 DIAGNOSIS — M25.561 RIGHT KNEE PAIN, UNSPECIFIED CHRONICITY: ICD-10-CM

## 2024-11-01 DIAGNOSIS — R29.898 WEAKNESS OF RIGHT LOWER EXTREMITY: ICD-10-CM

## 2024-11-01 PROCEDURE — 97110 THERAPEUTIC EXERCISES: CPT | Mod: GP | Performed by: PHYSICAL THERAPIST

## 2024-11-08 ENCOUNTER — APPOINTMENT (OUTPATIENT)
Dept: PHYSICAL THERAPY | Facility: CLINIC | Age: 64
End: 2024-11-08
Payer: COMMERCIAL

## 2024-11-08 DIAGNOSIS — M25.461 EFFUSION, RIGHT KNEE: ICD-10-CM

## 2024-11-08 DIAGNOSIS — M25.561 RIGHT KNEE PAIN, UNSPECIFIED CHRONICITY: ICD-10-CM

## 2024-11-08 DIAGNOSIS — R29.898 WEAKNESS OF RIGHT LOWER EXTREMITY: Primary | ICD-10-CM

## 2024-11-08 PROCEDURE — 97110 THERAPEUTIC EXERCISES: CPT | Mod: GP | Performed by: PHYSICAL THERAPIST

## 2024-11-08 NOTE — PROGRESS NOTES
"Physical Therapy                                                                                  Physical Therapy Treatment       Patient name: Sheila Quan  MRN:   [id number]  Today's Date: 11/8/24     Time Calculation  Start Time: 0800  Stop Time: 0845  Time Calculation (min): 45 min  1. Weakness of right lower extremity        2. Right knee pain, unspecified chronicity  Follow Up In Physical Therapy      3. Effusion, right knee             General  Reason for Referral: Diagnosis  M25.561 (ICD-10-CM) - Right knee pain, unspecified chronicity  Referred By: Sary Moreno PA-C  Past Medical History Relevant to Rehab: hx of heart surgery in 1963 (pt notes she had recent check up and it was clear- no restrictions/precautions), hx of several breast surgeries, OA in knees  Visit #8, no auth/20V hard max, onset 8/27/24  Precautions  hx of abnormal breast cells/no formal cancer dx, no pacemaker or implanted device     Subjective:  Patient reports her Knee feels pretty good this morning.  States pain depends on her level of activity, if she has been standing and squatting a lot pain will increase.  Has difficulty getting down onto her knees and getting back up.  -Response to last session: good    -Performing HEP: yes    Pain   0/10 start of session, states pain sometimes wakes her up at night.  0-1/10 end of session    Treatment:     Therapeutic Exercise (45minutes) - Program designed to eliminate pain increase flexibility, increase strength for full functional pain free mobility  -Sci fit x 8 minutes, seat = 10, Level = increased to 1.6    Standing exercises:  -Back against wall with ball behind R distal thigh, Quad-sets 5 sec hold x 15 reps  -Wall slide to ~30 degree knee bend 5 sec hold x 15 reps  In parallel bars  -SLR's 3-way 1 x 10 reps each direction R/L  -R HS curls with ankle DF 2 x 10 reps  -Forward step-ups on 4\" step 2 x 10 reps R/L   -R Lateral step-ups on 4\" step 2 x 10 reps  -Lunge stretch for Hip " "flexors/Quad stretch 10-15 sec hold x 5 reps L/R    Supine/Mat: (deferred mat exercises for today)  -Bilateral quad-sets with small bolster 2 sets x 10 reps   -R/L SLR 2 sets x 10 reps with 2# ankle wt's (bilateral)  -Small ROM bridge with hip adduction ball squeeze 2 sets x 10 reps, 3-5 sec hold   -Hooklying hip adduction ball squeeze x 20 reps, 5 sec hold   -VMO SLR 10 reps x 2 sets 2# ankle cuffs (bilateral)  -Hooklying B hip abduction against orange theraband 2 x 15 reps, 3-5 sec hold  Long sit  -Hamstring stretch from edge of mat table 20\" hold x 3 reps L/R    Assessment:  Patient required verbal cues with new exercises to complete correctly.  Instructed patient to work in pain free range.  Patient progressing towards goals slowly but steadily. Therapeutic exercises completed this date to improve strength so to perform pain free functional activities.    Plan:  Recheck next visit with possible discharge at that time if doing well.   OP PT Plan  Treatment/Interventions: Cryotherapy, Education/ Instruction, Electrical stimulation, Gait training, Manual therapy, Neuromuscular re-education, Therapeutic activities, Therapeutic exercises, Taping techniques, Ultrasound  PT Plan: Skilled PT  PT Frequency: 2 times per week  Duration: 4-5 weeks  Onset Date: 08/27/24  Number of Treatments Authorized: 20V Hard max  Rehab Potential: Good  Plan of Care Agreement: Patient     Tasia Knapp, PT    Active       PT Problem       PT Goal 1       Start:  09/20/24    Expected End:  10/25/24       The patient will score at least 79 points on LEFS in order to demonstrate improved sx level and improved ability to complete functional mobility tasks and ADLs/IADLs.           PT Goal 2       Start:  09/20/24    Expected End:  10/25/24       The patient will report 0/10 pain in right knee during functional activities in order to demonstrate improved pain levels.           PT Goal 3       Start:  09/20/24    Expected End:  10/25/24       " "The patient will demonstrate 5/5 strength via MMT of RLE in order to improve ability to complete functional activities and mobility for ADLs/IADLs.           PT Goal 4       Start:  09/20/24    Expected End:  10/25/24       The patient will report the ability to pivot on RLE when completing ambulation and ADLs/IADLs without reports of increase in pain.         PT Goal 5       Start:  09/20/24    Expected End:  10/25/24       The patient will subjectively report compliance with recommended HEP in order to maximize functional gains during physical therapy plan of care.           Patient Stated Goal 1       Start:  09/20/24    Expected End:  10/25/24       \"Hoping my knee will feel better and learn some things that will help.\"              "

## 2024-11-14 ENCOUNTER — APPOINTMENT (OUTPATIENT)
Dept: PHYSICAL THERAPY | Facility: CLINIC | Age: 64
End: 2024-11-14
Payer: COMMERCIAL

## 2024-11-14 DIAGNOSIS — M25.561 RIGHT KNEE PAIN, UNSPECIFIED CHRONICITY: ICD-10-CM

## 2024-11-14 DIAGNOSIS — R29.898 WEAKNESS OF RIGHT LOWER EXTREMITY: Primary | ICD-10-CM

## 2024-11-14 DIAGNOSIS — M25.461 EFFUSION, RIGHT KNEE: ICD-10-CM

## 2024-11-14 PROCEDURE — 97530 THERAPEUTIC ACTIVITIES: CPT | Mod: GP

## 2024-11-14 PROCEDURE — 97110 THERAPEUTIC EXERCISES: CPT | Mod: GP

## 2024-11-14 NOTE — PROGRESS NOTES
Physical Therapy    Physical Therapy Treatment/PT Discharge Summary    Patient Name: Sheila Quan  MRN: [id number]  Today's Date: 11/14/2024    Time Entry:   Time Calculation  Start Time: 0800  Stop Time: 0843  Time Calculation (min): 43 min     PT Therapeutic Procedures Time Entry  Therapeutic Exercise Time Entry: 10  Therapeutic Activity Time Entry: 33       Assessment:  Patient has made good progress in PT.  All PT goals achieved or partially achieved.  Patient verbalizes good understanding of previous HEP, and demonstrates good performance of new exercises this date.  See below for objective measures and goal status.  Patient to continue indep with HEP and follow up with MD as needed.   Instructed patient to work in pain free range.       Plan:  Discharge PT.  Continue indep with HEP as instructed.  Follow up with MD as needed.        Current Problem  1. Weakness of right lower extremity        2. Right knee pain, unspecified chronicity  Follow Up In Physical Therapy      3. Effusion, right knee            General  Reason for Referral: Diagnosis  M25.561 (ICD-10-CM) - Right knee pain, unspecified chronicity  Referred By: Sary Moreno PA-C  Past Medical History Relevant to Rehab: hx of heart surgery in 1963 (pt notes she had recent check up and it was clear- no restrictions/precautions), hx of several breast surgeries, OA in knees  Visit #9, no auth/20V hard max, onset 8/27/24       Subjective    Patient reports she feels fine, but can't squat or get down to the floor and back up.  If sitting too long gets stiff and tight,  also some swelling in knee at the end of the day. Pain is typically in the back of the knee, but occas in front of her knee when walking downhill.  Pain rated 0/10 at present, worst ache at 7-8/10 at night - better with over the counter meds  -Response to last session: good    -Performing HEP: yes    Precautions  hx of abnormal breast cells/no formal cancer dx, no pacemaker or implanted device  "     Pain  0/10 at the start  and end of session       Objective   Knee Joint Line Girth:   Right: 34 cm but minimal visible edema superior to patella  Left: 34 cm     ROM  Right knee  Extension 1-2 deg resting position, 0 deg A  Flexion 132 deg A  Hips B WFL  Ankles B WFL    Flexibility  Hamstrings L good, R good minus with minimal pain back of knee  Gastrocs B good  Piriformis B  good  Quads B good    Strength  Hip flexion supine B 4+/5  Bridges 100%  Hip abduction sidelying B 4+/5  Hip extension prone B 5/5  Knee extension B 5/5  Knee flexion B 5/5  Ankle DF B 5/5  Ankle PF B 5/5      Gait  Amb indep without assistive device community distances, with good gait pattern and good balance. Pt noted pain in R knee when pivoting on RLE.     Outcome Measures  Other Measures  Lower Extremity Funtional Score (LEFS): 71/80   Improved from 69/80 at Evaluation.    Treatments:  Therapeutic Activities  Re-eval completed this date.  See Subjective and Goal status/comments for details.     Therapeutic exercises  Supine R hamstring stretch with strap 3 x 20\" hold     Standing   -Forward step-ups on 4\" step 2 x 10 reps R/L   -R/L Lateral step-ups  with opposite hip ABD on 4\" step 2 x 10 reps     OP EDUCATION:  Updated HEP and written instructions provided for the following exercises.   Access Code: [email or web address]  URL: [email or web address]  Date: 11/14/2024  Prepared by: Carolee Lou    Exercises  - Supine Quad Set  - 1-3 x daily - 7 x weekly - 1-3 sets - 10 reps - 5\" hold  - Active Straight Leg Raise with Quad Set  - 1-3 x daily - 7 x weekly - 1-3 sets - 10 reps  - Supine Straight Leg Hip Adduction and Quad Set with Ball  - 1-3 x daily - 7 x weekly - 1-3 sets - 10 reps - 5\" hold  - Short Arc Quad with Ball Squeeze  - 1-3 x daily - 7 x weekly - 1-3 sets - 10 reps - 5\" hold  - Quad Setting and Stretching  - 1-3 x daily - 7 x weekly - 1-3 sets - 10 reps - 5\" hold  - Supine Hamstring Stretch with Strap  - 1-3 x " "daily - 7 x weekly - 1 sets - 2 reps - 20\" hold  - Lateral Step Down  - 1-3 x daily - 7 x weekly - 2 sets - 10 reps  - Runner's Step up with Arms Forward  - 1-3 x daily - 7 x weekly - 2 sets - 10 reps      Goals:  Resolved       PT Problem       PT Goal 1 (Adequate for Discharge)       Start:  09/20/24    Expected End:  10/25/24       The patient will score at least 79 points on LEFS in order to demonstrate improved sx level and improved ability to complete functional mobility tasks and ADLs/IADLs.           PT Goal 2 (Adequate for Discharge)       Start:  09/20/24    Expected End:  10/25/24       The patient will report 0/10 pain in right knee during functional activities in order to demonstrate improved pain levels.           PT Goal 3 (Met)       Start:  09/20/24    Expected End:  10/25/24    Resolved:  11/14/24    The patient will demonstrate 5/5 strength via MMT of RLE in order to improve ability to complete functional activities and mobility for ADLs/IADLs.           PT Goal 4 (Adequate for Discharge)       Start:  09/20/24    Expected End:  10/25/24       The patient will report the ability to pivot on RLE when completing ambulation and ADLs/IADLs without reports of increase in pain.         PT Goal 5 (Met)       Start:  09/20/24    Expected End:  10/25/24    Resolved:  11/14/24    The patient will subjectively report compliance with recommended HEP in order to maximize functional gains during physical therapy plan of care.           Patient Stated Goal 1 (Met)       Start:  09/20/24    Expected End:  10/25/24    Resolved:  11/14/24    \"Hoping my knee will feel better and learn some things that will help.\"              "